# Patient Record
Sex: MALE | Race: BLACK OR AFRICAN AMERICAN | Employment: FULL TIME | URBAN - METROPOLITAN AREA
[De-identification: names, ages, dates, MRNs, and addresses within clinical notes are randomized per-mention and may not be internally consistent; named-entity substitution may affect disease eponyms.]

---

## 2018-03-19 ENCOUNTER — HOSPITAL ENCOUNTER (EMERGENCY)
Age: 63
Discharge: HOME | End: 2018-03-19
Attending: EMERGENCY MEDICINE | Admitting: EMERGENCY MEDICINE
Payer: COMMERCIAL

## 2018-03-19 VITALS
OXYGEN SATURATION: 95 % | DIASTOLIC BLOOD PRESSURE: 108 MMHG | RESPIRATION RATE: 20 BRPM | WEIGHT: 240 LBS | HEART RATE: 70 BPM | TEMPERATURE: 97.5 F | SYSTOLIC BLOOD PRESSURE: 179 MMHG | BODY MASS INDEX: 29.22 KG/M2 | HEIGHT: 76 IN

## 2018-03-19 DIAGNOSIS — I10 ESSENTIAL HYPERTENSION: Primary | ICD-10-CM

## 2018-03-19 LAB
ANION GAP SERPL CALC-SCNC: 9 MMOL/L (ref 5–15)
BASOPHILS # BLD: 0 K/UL (ref 0–0.1)
BASOPHILS NFR BLD: 0 % (ref 0–1)
BUN SERPL-MCNC: 25 MG/DL (ref 6–20)
BUN/CREAT SERPL: 14 (ref 12–20)
CALCIUM SERPL-MCNC: 8.7 MG/DL (ref 8.5–10.1)
CHLORIDE SERPL-SCNC: 103 MMOL/L (ref 97–108)
CO2 SERPL-SCNC: 29 MMOL/L (ref 21–32)
CREAT SERPL-MCNC: 1.77 MG/DL (ref 0.7–1.3)
DIFFERENTIAL METHOD BLD: ABNORMAL
EOSINOPHIL # BLD: 0.1 K/UL (ref 0–0.4)
EOSINOPHIL NFR BLD: 1 % (ref 0–7)
ERYTHROCYTE [DISTWIDTH] IN BLOOD BY AUTOMATED COUNT: 12.1 % (ref 11.5–14.5)
GLUCOSE SERPL-MCNC: 126 MG/DL (ref 65–100)
HCT VFR BLD AUTO: 41.4 % (ref 36.6–50.3)
HGB BLD-MCNC: 13.9 G/DL (ref 12.1–17)
IMM GRANULOCYTES # BLD: 0.1 K/UL (ref 0–0.04)
IMM GRANULOCYTES NFR BLD AUTO: 1 % (ref 0–0.5)
LYMPHOCYTES # BLD: 2.1 K/UL (ref 0.8–3.5)
LYMPHOCYTES NFR BLD: 30 % (ref 12–49)
MCH RBC QN AUTO: 31.2 PG (ref 26–34)
MCHC RBC AUTO-ENTMCNC: 33.6 G/DL (ref 30–36.5)
MCV RBC AUTO: 92.8 FL (ref 80–99)
MONOCYTES # BLD: 0.5 K/UL (ref 0–1)
MONOCYTES NFR BLD: 7 % (ref 5–13)
NEUTS SEG # BLD: 4.3 K/UL (ref 1.8–8)
NEUTS SEG NFR BLD: 61 % (ref 32–75)
NRBC # BLD: 0 K/UL (ref 0–0.01)
NRBC BLD-RTO: 0 PER 100 WBC
PLATELET # BLD AUTO: 181 K/UL (ref 150–400)
PMV BLD AUTO: 10.3 FL (ref 8.9–12.9)
POTASSIUM SERPL-SCNC: 3.5 MMOL/L (ref 3.5–5.1)
RBC # BLD AUTO: 4.46 M/UL (ref 4.1–5.7)
SODIUM SERPL-SCNC: 141 MMOL/L (ref 136–145)
TROPONIN I BLD-MCNC: <0.04 NG/ML (ref 0–0.08)
TROPONIN I SERPL-MCNC: <0.04 NG/ML
WBC # BLD AUTO: 7 K/UL (ref 4.1–11.1)

## 2018-03-19 PROCEDURE — 84484 ASSAY OF TROPONIN QUANT: CPT | Performed by: EMERGENCY MEDICINE

## 2018-03-19 PROCEDURE — 80048 BASIC METABOLIC PNL TOTAL CA: CPT | Performed by: EMERGENCY MEDICINE

## 2018-03-19 PROCEDURE — 74011250637 HC RX REV CODE- 250/637: Performed by: EMERGENCY MEDICINE

## 2018-03-19 PROCEDURE — 93005 ELECTROCARDIOGRAM TRACING: CPT

## 2018-03-19 PROCEDURE — 99285 EMERGENCY DEPT VISIT HI MDM: CPT

## 2018-03-19 PROCEDURE — 36415 COLL VENOUS BLD VENIPUNCTURE: CPT | Performed by: EMERGENCY MEDICINE

## 2018-03-19 PROCEDURE — 85025 COMPLETE CBC W/AUTO DIFF WBC: CPT | Performed by: EMERGENCY MEDICINE

## 2018-03-19 RX ORDER — LOSARTAN POTASSIUM 50 MG/1
50 TABLET ORAL DAILY
Qty: 30 TAB | Refills: 0 | Status: SHIPPED | OUTPATIENT
Start: 2018-03-19 | End: 2018-03-26 | Stop reason: ALTCHOICE

## 2018-03-19 RX ORDER — LOSARTAN POTASSIUM 50 MG/1
50 TABLET ORAL DAILY
Status: DISCONTINUED | OUTPATIENT
Start: 2018-03-20 | End: 2018-03-19 | Stop reason: SDUPTHER

## 2018-03-19 RX ORDER — LOSARTAN POTASSIUM 50 MG/1
50 TABLET ORAL
Status: COMPLETED | OUTPATIENT
Start: 2018-03-19 | End: 2018-03-19

## 2018-03-19 RX ORDER — SODIUM CHLORIDE 0.9 % (FLUSH) 0.9 %
5-10 SYRINGE (ML) INJECTION AS NEEDED
Status: DISCONTINUED | OUTPATIENT
Start: 2018-03-19 | End: 2018-03-20 | Stop reason: HOSPADM

## 2018-03-19 RX ORDER — SODIUM CHLORIDE 0.9 % (FLUSH) 0.9 %
5-10 SYRINGE (ML) INJECTION EVERY 8 HOURS
Status: DISCONTINUED | OUTPATIENT
Start: 2018-03-19 | End: 2018-03-20 | Stop reason: HOSPADM

## 2018-03-19 RX ADMIN — LOSARTAN POTASSIUM 50 MG: 50 TABLET ORAL at 19:01

## 2018-03-19 NOTE — ED PROVIDER NOTES
HPI Comments: 57 y.o. male with past medical history significant for C-spine arthritis, HTN, and CKD, who presents from work via EMS with chief complaint of dizziness. Per EMS, pt had a blood pressure of 240/98 en route and other vital signs were normal. Pt reports he started to feel dizzy today while seated at his desk at work. He describes the dizziness as a spinning sensation, but states, \"It was not that the room appeared to be spinning, but that when I stood, I felt my equilibrium was off. \" He reports associated nausea, mild diaphoresis, and anxiety. He states he is unsure if he had any visual disturbance, noting that he \"may have seen some spinning while feeling dizzy. \" Pt reports he felt nauseated and \"queasy\" and had a bowel movement; he states his nausea has \"mostly\" improved at this time. Pt states he had a similar event \"a couple months ago\" but thought it was due to dehydration. He admits he has been diagnosed with HTN and advised to take HTN medication, but has not been compliant. He states he has not seen his PCP \"in a while\" but agrees to do so soon. Pt denies any recent infection or abx use. He specifically denies any CP, abdominal pain, headache, speech disturbance, LOC, blood in stool, or hematuria. There are no other acute medical concerns at this time. Social hx: former tobacco use, denies EtOH use, illicit drug use  PCP: Delia Melo MD    Note written by Taz Francis, as dictated by Anh Medel MD 6:41 PM      The history is provided by the patient and the EMS personnel. No  was used. Past Medical History:   Diagnosis Date    CKD (chronic kidney disease) stage 3, GFR 30-59 ml/min     Dermatitis 7/3/2013    HTN (hypertension) 7/2/2013    Neck pain     c-spine arthritis       Past Surgical History:   Procedure Laterality Date    HX COLONOSCOPY  2000?          Family History:   Problem Relation Age of Onset    Cancer Mother 76     pancreatic    Cancer Father 76     lymphoma    Stroke Father 76       Social History     Social History    Marital status:      Spouse name: Leilani Patton Number of children: 2    Years of education: N/A     Occupational History   [employer]      Social History Main Topics    Smoking status: Former Smoker     Packs/day: 0.25     Types: Cigarettes     Quit date: 1/8/1985    Smokeless tobacco: Never Used    Alcohol use No    Drug use: No    Sexual activity: Yes     Other Topics Concern    Not on file     Social History Narrative    2 daughters, 12, 15         ALLERGIES: Phenylephrine    Review of Systems   Constitutional: Positive for diaphoresis. Negative for appetite change, fever and unexpected weight change. HENT: Negative. Negative for ear pain, hearing loss, nosebleeds, rhinorrhea, sore throat and trouble swallowing. Respiratory: Negative. Negative for cough, chest tightness and shortness of breath. Cardiovascular: Negative. Negative for chest pain and palpitations. Gastrointestinal: Positive for nausea. Negative for abdominal distention, abdominal pain, blood in stool and vomiting. Endocrine: Negative. Genitourinary: Negative for dysuria and hematuria. Musculoskeletal: Negative. Negative for back pain and myalgias. Skin: Negative. Negative for rash. Allergic/Immunologic: Negative. Neurological: Positive for dizziness. Negative for syncope, speech difficulty, weakness, numbness and headaches. Hematological: Negative. Psychiatric/Behavioral: The patient is nervous/anxious. All other systems reviewed and are negative. Vitals:    03/19/18 1845 03/19/18 1900 03/19/18 1901   BP: (!) 191/104 (!) 180/107 (!) 180/107   Pulse: 69 63 63   Resp: 16 19    Temp: 97.5 °F (36.4 °C)     SpO2: 96% 96%    Weight: 108.9 kg (240 lb)     Height: 6' 4\" (1.93 m)              Physical Exam   Constitutional: He is oriented to person, place, and time.  He appears well-developed and well-nourished. No distress. HENT:   Head: Normocephalic and atraumatic. Right Ear: External ear normal.   Left Ear: External ear normal.   Nose: Nose normal.   Mouth/Throat: Oropharynx is clear and moist.   Eyes: Conjunctivae and EOM are normal. Pupils are equal, round, and reactive to light. Neck: Normal range of motion. Neck supple. No JVD present. No thyromegaly present. Cardiovascular: Normal rate, regular rhythm, normal heart sounds and intact distal pulses. No murmur heard. Pulmonary/Chest: Effort normal and breath sounds normal. No respiratory distress. He has no wheezes. He has no rales. Abdominal: Soft. Bowel sounds are normal. He exhibits no distension. There is no tenderness. Musculoskeletal: Normal range of motion. He exhibits no edema. Neurological: He is alert and oriented to person, place, and time. No cranial nerve deficit. Skin: Skin is warm and dry. No rash noted. Psychiatric: He has a normal mood and affect. His behavior is normal. Thought content normal.   Note written by Taz Francis, as dictated by Anh Medel MD 6:41 PM      MDM  Number of Diagnoses or Management Options  Diagnosis management comments:     Assessment/Plan: Check labs, monitor BP, and reassess. ED Course       Procedures    ED EKG interpretation: 1853  Rate 61, NSR, left axis deviation, LVH, nonspecific ST/T changes, no ectopy. Note written by Taz Francis, as dictated by Anh Medel MD 6:56 PM    7:39 PM  Pt's chemistry is significant for a creatinine of 1.7, his previous creatinine from 2015 was 1.59; his CBC is normal, his troponin is normal, his last blood pressure was 180/107, we will continue to monitor his blood pressure at this time, will check second troponin at 2 hour harley and reassess.     8:25 PM  Feels much better since his blood pressure has gone down, 2nd troponin is negative, will d/c home with Rx for Losartan and close f/u with PCP.

## 2018-03-20 LAB
ATRIAL RATE: 61 BPM
CALCULATED P AXIS, ECG09: 57 DEGREES
CALCULATED R AXIS, ECG10: -34 DEGREES
CALCULATED T AXIS, ECG11: 5 DEGREES
DIAGNOSIS, 93000: NORMAL
P-R INTERVAL, ECG05: 192 MS
Q-T INTERVAL, ECG07: 454 MS
QRS DURATION, ECG06: 98 MS
QTC CALCULATION (BEZET), ECG08: 457 MS
VENTRICULAR RATE, ECG03: 61 BPM

## 2018-03-20 NOTE — DISCHARGE INSTRUCTIONS
High Blood Pressure: Care Instructions  Your Care Instructions    If your blood pressure is usually above 140/90, you have high blood pressure, or hypertension. That means the top number is 140 or higher or the bottom number is 90 or higher, or both. Despite what a lot of people think, high blood pressure usually doesn't cause headaches or make you feel dizzy or lightheaded. It usually has no symptoms. But it does increase your risk for heart attack, stroke, and kidney or eye damage. The higher your blood pressure, the more your risk increases. Your doctor will give you a goal for your blood pressure. Your goal will be based on your health and your age. An example of a goal is to keep your blood pressure below 140/90. Lifestyle changes, such as eating healthy and being active, are always important to help lower blood pressure. You might also take medicine to reach your blood pressure goal.  Follow-up care is a key part of your treatment and safety. Be sure to make and go to all appointments, and call your doctor if you are having problems. It's also a good idea to know your test results and keep a list of the medicines you take. How can you care for yourself at home? Medical treatment  · If you stop taking your medicine, your blood pressure will go back up. You may take one or more types of medicine to lower your blood pressure. Be safe with medicines. Take your medicine exactly as prescribed. Call your doctor if you think you are having a problem with your medicine. · Talk to your doctor before you start taking aspirin every day. Aspirin can help certain people lower their risk of a heart attack or stroke. But taking aspirin isn't right for everyone, because it can cause serious bleeding. · See your doctor regularly. You may need to see the doctor more often at first or until your blood pressure comes down.   · If you are taking blood pressure medicine, talk to your doctor before you take decongestants or anti-inflammatory medicine, such as ibuprofen. Some of these medicines can raise blood pressure. · Learn how to check your blood pressure at home. Lifestyle changes  · Stay at a healthy weight. This is especially important if you put on weight around the waist. Losing even 10 pounds can help you lower your blood pressure. · If your doctor recommends it, get more exercise. Walking is a good choice. Bit by bit, increase the amount you walk every day. Try for at least 30 minutes on most days of the week. You also may want to swim, bike, or do other activities. · Avoid or limit alcohol. Talk to your doctor about whether you can drink any alcohol. · Try to limit how much sodium you eat to less than 2,300 milligrams (mg) a day. Your doctor may ask you to try to eat less than 1,500 mg a day. · Eat plenty of fruits (such as bananas and oranges), vegetables, legumes, whole grains, and low-fat dairy products. · Lower the amount of saturated fat in your diet. Saturated fat is found in animal products such as milk, cheese, and meat. Limiting these foods may help you lose weight and also lower your risk for heart disease. · Do not smoke. Smoking increases your risk for heart attack and stroke. If you need help quitting, talk to your doctor about stop-smoking programs and medicines. These can increase your chances of quitting for good. When should you call for help? Call 911 anytime you think you may need emergency care. This may mean having symptoms that suggest that your blood pressure is causing a serious heart or blood vessel problem. Your blood pressure may be over 180/110. ? For example, call 911 if:  ? · You have symptoms of a heart attack. These may include:  ¨ Chest pain or pressure, or a strange feeling in the chest.  ¨ Sweating. ¨ Shortness of breath. ¨ Nausea or vomiting.   ¨ Pain, pressure, or a strange feeling in the back, neck, jaw, or upper belly or in one or both shoulders or arms.  ¨ Lightheadedness or sudden weakness. ¨ A fast or irregular heartbeat. ? · You have symptoms of a stroke. These may include:  ¨ Sudden numbness, tingling, weakness, or loss of movement in your face, arm, or leg, especially on only one side of your body. ¨ Sudden vision changes. ¨ Sudden trouble speaking. ¨ Sudden confusion or trouble understanding simple statements. ¨ Sudden problems with walking or balance. ¨ A sudden, severe headache that is different from past headaches. ? · You have severe back or belly pain. ?Do not wait until your blood pressure comes down on its own. Get help right away. ?Call your doctor now or seek immediate care if:  ? · Your blood pressure is much higher than normal (such as 180/110 or higher), but you don't have symptoms. ? · You think high blood pressure is causing symptoms, such as:  ¨ Severe headache. ¨ Blurry vision. ? Watch closely for changes in your health, and be sure to contact your doctor if:  ? · Your blood pressure measures 140/90 or higher at least 2 times. That means the top number is 140 or higher or the bottom number is 90 or higher, or both. ? · You think you may be having side effects from your blood pressure medicine. ? · Your blood pressure is usually normal, but it goes above normal at least 2 times. Where can you learn more? Go to http://luciano-dior.info/. Enter Q994 in the search box to learn more about \"High Blood Pressure: Care Instructions. \"  Current as of: September 21, 2016  Content Version: 11.4  © 2570-2205 [employer]. Care instructions adapted under license by [employer] (which disclaims liability or warranty for this information). If you have questions about a medical condition or this instruction, always ask your healthcare professional. [place] [address] any warranty or liability for your use of this information.          We hope that we have addressed all of your medical concerns. The examination and treatment you received in the Emergency Department were for an emergent problem and were not intended as complete care. It is important that you follow up with your healthcare provider(s) for ongoing care. If your symptoms worsen or do not improve as expected, and you are unable to reach your usual health care provider(s), you should return to the Emergency Department. Today's healthcare is undergoing tremendous change, and patient satisfaction surveys are one of the many tools to assess the quality of medical care. You may receive a survey from the [employer] regarding your experience in the Emergency Department. I hope that your experience has been completely positive, particularly the medical care that I provided. As such, please participate in the survey; anything less than excellent does not meet my expectations or intentions. Formerly [facility] and 8 [facility] participate in nationally recognized quality of care measures. If your blood pressure is greater than 120/80, as reported below, we urge that you seek medical care to address the potential of high blood pressure, commonly known as hypertension. Hypertension can be hereditary or can be caused by certain medical conditions, pain, stress, or \"white coat syndrome. \"       Please make an appointment with your health care provider(s) for follow up of your Emergency Department visit. VITALS:   Patient Vitals for the past 8 hrs:   Temp Pulse Resp BP SpO2   03/19/18 2030 - 63 19 (!) 188/105 96 %   03/19/18 2000 - 62 18 (!) 193/113 96 %   03/19/18 1930 - 60 17 (!) 176/110 95 %   03/19/18 1915 - 62 12 (!) 191/114 95 %   03/19/18 1901 - 63 - (!) 180/107 -   03/19/18 1900 - 63 19 (!) 180/107 96 %   03/19/18 1845 97.5 °F (36.4 °C) 69 16 (!) 191/104 96 %          Thank you for allowing us to provide you with medical care today.   We realize that you have many choices for your emergency care needs. Please choose us in the future for any continued health care needs. Regards,           Pedro Preciado [address].   Office: [phone]            Recent Results (from the past 24 hour(s))   METABOLIC PANEL, BASIC    Collection Time: 03/19/18  6:53 PM   Result Value Ref Range    Sodium 141 136 - 145 mmol/L    Potassium 3.5 3.5 - 5.1 mmol/L    Chloride 103 97 - 108 mmol/L    CO2 29 21 - 32 mmol/L    Anion gap 9 5 - 15 mmol/L    Glucose 126 (H) 65 - 100 mg/dL    BUN 25 (H) 6 - 20 MG/DL    Creatinine 1.77 (H) 0.70 - 1.30 MG/DL    BUN/Creatinine ratio 14 12 - 20      GFR est AA 47 (L) >60 ml/min/1.73m2    GFR est non-AA 39 (L) >60 ml/min/1.73m2    Calcium 8.7 8.5 - 10.1 MG/DL   CBC WITH AUTOMATED DIFF    Collection Time: 03/19/18  6:53 PM   Result Value Ref Range    WBC 7.0 4.1 - 11.1 K/uL    RBC 4.46 4.10 - 5.70 M/uL    HGB 13.9 12.1 - 17.0 g/dL    HCT 41.4 36.6 - 50.3 %    MCV 92.8 80.0 - 99.0 FL    MCH 31.2 26.0 - 34.0 PG    MCHC 33.6 30.0 - 36.5 g/dL    RDW 12.1 11.5 - 14.5 %    PLATELET 906 186 - 765 K/uL    MPV 10.3 8.9 - 12.9 FL    NRBC 0.0 0  WBC    ABSOLUTE NRBC 0.00 0.00 - 0.01 K/uL    NEUTROPHILS 61 32 - 75 %    LYMPHOCYTES 30 12 - 49 %    MONOCYTES 7 5 - 13 %    EOSINOPHILS 1 0 - 7 %    BASOPHILS 0 0 - 1 %    IMMATURE GRANULOCYTES 1 (H) 0.0 - 0.5 %    ABS. NEUTROPHILS 4.3 1.8 - 8.0 K/UL    ABS. LYMPHOCYTES 2.1 0.8 - 3.5 K/UL    ABS. MONOCYTES 0.5 0.0 - 1.0 K/UL    ABS. EOSINOPHILS 0.1 0.0 - 0.4 K/UL    ABS. BASOPHILS 0.0 0.0 - 0.1 K/UL    ABS. IMM.  GRANS. 0.1 (H) 0.00 - 0.04 K/UL    DF AUTOMATED     TROPONIN I    Collection Time: 03/19/18  6:53 PM   Result Value Ref Range    Troponin-I, Qt. <0.04 <0.05 ng/mL   EKG, 12 LEAD, INITIAL    Collection Time: 03/19/18  6:53 PM   Result Value Ref Range    Ventricular Rate 61 BPM    Atrial Rate 61 BPM    P-R Interval 192 ms    QRS Duration 98 ms    Q-T Interval 454 ms    QTC Calculation (Bezet) 457 ms    Calculated P Axis 57 degrees    Calculated R Axis -34 degrees    Calculated T Axis 5 degrees    Diagnosis       Normal sinus rhythm  Left axis deviation  Voltage criteria for left ventricular hypertrophy  Abnormal ECG  No previous ECGs available     POC TROPONIN-I    Collection Time: 03/19/18  8:41 PM   Result Value Ref Range    Troponin-I (POC) <0.04 0.00 - 0.08 ng/mL       No results found.

## 2018-03-26 ENCOUNTER — OFFICE VISIT (OUTPATIENT)
Dept: INTERNAL MEDICINE CLINIC | Age: 63
End: 2018-03-26

## 2018-03-26 VITALS
SYSTOLIC BLOOD PRESSURE: 171 MMHG | OXYGEN SATURATION: 96 % | TEMPERATURE: 97.8 F | WEIGHT: 242 LBS | RESPIRATION RATE: 12 BRPM | BODY MASS INDEX: 29.47 KG/M2 | HEART RATE: 76 BPM | HEIGHT: 76 IN | DIASTOLIC BLOOD PRESSURE: 108 MMHG

## 2018-03-26 DIAGNOSIS — N18.30 CKD (CHRONIC KIDNEY DISEASE) STAGE 3, GFR 30-59 ML/MIN (HCC): ICD-10-CM

## 2018-03-26 DIAGNOSIS — I10 ESSENTIAL HYPERTENSION: Primary | ICD-10-CM

## 2018-03-26 RX ORDER — LOSARTAN POTASSIUM 50 MG/1
50 TABLET ORAL DAILY
Qty: 30 TAB | Refills: 1 | Status: SHIPPED | OUTPATIENT
Start: 2018-03-26 | End: 2018-04-23 | Stop reason: ALTCHOICE

## 2018-03-26 RX ORDER — AMLODIPINE BESYLATE 5 MG/1
5 TABLET ORAL DAILY
Qty: 30 TAB | Refills: 1 | Status: SHIPPED | OUTPATIENT
Start: 2018-03-26 | End: 2018-04-23 | Stop reason: CLARIF

## 2018-03-26 NOTE — MR AVS SNAPSHOT
[address] [facility], [place] 
 
 
 [address] [place] [address] [address] [facility] 
[phone] Patient: Marsha Petty 
MRN: [id number] Visit Information Date & Time Provider Department Dept. Phone Encounter #  
 3/26/2018  8:00 AM Farshad Grimes MD [facility] [address]  [id number] Upcoming Health Maintenance Date Due Hepatitis C Screening 1955 COLONOSCOPY 1/1/2010 DTaP/Tdap/Td series (2 - Tdap) 1/1/2017 Influenza Age 5 to Adult 8/1/2017 Allergies as of 3/26/2018  Review Complete On: 3/26/2018 By: Farshad Grimes MD  
  
 Severity Noted Reaction Type Reactions Phenylephrine  02/16/2012   Intolerance Nausea and Vomiting, Anxiety Current Immunizations  Reviewed on 3/30/2015 Name Date DTAP Vaccine 1/1/2007 Hep A and Hep B Vaccine 7/25/2013, 7/9/2013, 7/2/2013 Not reviewed this visit You Were Diagnosed With   
  
 Codes Comments Essential hypertension    -  Primary ICD-10-CM: I10 
ICD-9-CM: 401.9 CKD (chronic kidney disease) stage 3, GFR 30-59 ml/min     ICD-10-CM: N18.3 ICD-9-CM: 350. 3 Vitals BP Pulse Temp Resp Height(growth percentile) Weight(growth percentile) (!) 171/108 (BP 1 Location: Left arm, BP Patient Position: Sitting) 76 97.8 °F (36.6 °C) 12 6' 4\" (1.93 m) 242 lb (109.8 kg) SpO2 BMI Smoking Status 96% 29.46 kg/m2 Former Smoker Vitals History BMI and BSA Data Body Mass Index Body Surface Area  
 29.46 kg/m 2 2.43 m 2 Preferred Pharmacy Pharmacy Name Phone [id number] [facility] [phone] Your Updated Medication List  
  
   
This list is accurate as of 3/26/18  8:32 AM.  Always use your most recent med list. amLODIPine 5 mg tablet Commonly known as:  Cookie Boozer Take 1 Tab by mouth daily. Indications: hypertension hydrocortisone valerate 0.2 % topical cream  
Commonly known as:  [employer] Apply  to affected area two (2) times a day. use thin layer  
  
 losartan 50 mg tablet Commonly known as:  COZAAR Take 1 Tab by mouth daily. multivitamin tablet Commonly known as:  ONE A DAY Take 1 Tab by mouth daily. Prescriptions Sent to Pharmacy Refills  
 amLODIPine (NORVASC) 5 mg tablet 1 Sig: Take 1 Tab by mouth daily. Indications: hypertension Class: Normal  
 Pharmacy: [address] [place] [address] Ph #: [phone] Route: Oral  
 losartan (COZAAR) 50 mg tablet 1 Sig: Take 1 Tab by mouth daily. Class: Normal  
 Pharmacy: [address] [place] [address] Ph #: [phone] Route: Oral  
  
Introducing [facility] SERVICES! [facility] introduces [employer] patient portal. Now you can access parts of your medical record, email your doctor's office, and request medication refills online. 1. In your internet browser, go to [email or web address]. [employer] 2. Click on the First Time User? Click Here link in the Sign In box. You will see the New Member Sign Up page. 3. Enter your Leadjini Access Code exactly as it appears below. You will not need to use this code after youve completed the sign-up process. If you do not sign up before the expiration date, you must request a new code. · Leadjini Access Code: [email or web address] Expires: 6/17/2018  8:53 PM 
 
4. Enter the last four digits of your Social Security Number (xxxx) and Date of Birth (mm/dd/yyyy) as indicated and click Submit. You will be taken to the next sign-up page. 5. Create a EventKloudt ID. This will be your Leadjini login ID and cannot be changed, so think of one that is secure and easy to remember. 6. Create a [employer] password. You can change your password at any time. 7. Enter your Password Reset Question and Answer. This can be used at a later time if you forget your password. 8. Enter your e-mail address. You will receive e-mail notification when new information is available in [address] 9. Click Sign Up. You can now view and download portions of your medical record. 10. Click the Download Summary menu link to download a portable copy of your medical information. If you have questions, please visit the Frequently Asked Questions section of the [employer] website. Remember, one[employer]rum is NOT to be used for urgent needs. For medical emergencies, dial 911. Now available from your iPhone and Android! Please provide this summary of care documentation to your next provider. Your primary care clinician is listed as Kimo Beck. If you have any questions after today's visit, please call [phone].

## 2018-03-26 NOTE — PROGRESS NOTES
HISTORY OF PRESENT ILLNESS    Chief Complaint   Patient presents with    ED Follow-up       Presents for follow-up. Last seen 2015  Seen in ER 3/19/18 for vertigo s/s, severe HTN. EKG normal.    Labs showed stable CKD/renal function and cbc. His home BP is 150-180/100s now. Was re-started on losartan which he stopped taking over 2 years ago since he wanted to control his BP with exercise. Lab Results   Component Value Date/Time    Creatinine 1.77 (H) 03/19/2018 06:53 PM         Review of Systems   All other systems reviewed and are negative, except as noted in HPI    Past Medical and Surgical History   has a past medical history of CKD (chronic kidney disease) stage 3, GFR 30-59 ml/min; Dermatitis (7/3/2013); HTN (hypertension) (7/2/2013); and Neck pain. has a past surgical history that includes hx colonoscopy (2000?). reports that he quit smoking about 33 years ago. His smoking use included Cigarettes. He smoked 0.25 packs per day. He has never used smokeless tobacco. He reports that he does not drink alcohol or use illicit drugs. family history includes Cancer (age of onset: 76) in his father and mother; Stroke (age of onset: 76) in his father. Physical Exam   Nursing note and vitals reviewed. Blood pressure (!) 171/108, pulse 76, temperature 97.8 °F (36.6 °C), resp. rate 12, height 6' 4\" (1.93 m), weight 242 lb (109.8 kg), SpO2 96 %. Constitutional:  No distress. Eyes: Conjunctivae are normal.   Ears:  Hearing grossly intact  Cardiovascular: Normal rate. regular rhythm, no murmurs or gallops  No edema  Pulmonary/Chest: Effort normal.   CTAB  Musculoskeletal: moves all 4 extremities   Neurological: Alert and oriented to person, place, and time. Skin: No rash noted. Psychiatric: Normal mood and affect. Behavior is normal.     ASSESSMENT and PLAN  Diagnoses and all orders for this visit:    1. Essential hypertension-   remains uncontrolled, but just resumed meds.      Will add norvasc and cont losartan. Titrate in 1-2 months. Potential medication side effects were discussed with the patient; let me know if any occur. Advised to do only light exercise until BP is improving. Stressed compliance. Low sodium diet. Repeat renal function 1 month. -     amLODIPine (NORVASC) 5 mg tablet; Take 1 Tab by mouth daily. Indications: hypertension  -     losartan (COZAAR) 50 mg tablet; Take 1 Tab by mouth daily. 2. CKD (chronic kidney disease) stage 3, GFR 30-59 ml/min  -     losartan (COZAAR) 50 mg tablet; Take 1 Tab by mouth daily. lab results and schedule of future lab studies reviewed with patient  reviewed medications and side effects in detail  Return to clinic for further evaluation if new symptoms develop      Current Outpatient Prescriptions   Medication Sig    amLODIPine (NORVASC) 5 mg tablet Take 1 Tab by mouth daily. Indications: hypertension    losartan (COZAAR) 50 mg tablet Take 1 Tab by mouth daily.  hydrocortisone valerate (WESTCORT) 0.2 % topical cream Apply  to affected area two (2) times a day. use thin layer    multivitamin (ONE A DAY) tablet Take 1 Tab by mouth daily. No current facility-administered medications for this visit.

## 2018-03-27 ENCOUNTER — TELEPHONE (OUTPATIENT)
Dept: INTERNAL MEDICINE CLINIC | Age: 63
End: 2018-03-27

## 2018-03-27 NOTE — TELEPHONE ENCOUNTER
Pt has some questions about the new prescriptions. He wants to make sure that he needs to stop previous prescriptions and start the new ones.  [phone]

## 2018-04-23 ENCOUNTER — OFFICE VISIT (OUTPATIENT)
Dept: INTERNAL MEDICINE CLINIC | Age: 63
End: 2018-04-23

## 2018-04-23 VITALS
OXYGEN SATURATION: 96 % | HEART RATE: 75 BPM | RESPIRATION RATE: 16 BRPM | DIASTOLIC BLOOD PRESSURE: 108 MMHG | HEIGHT: 76 IN | WEIGHT: 244.2 LBS | BODY MASS INDEX: 29.74 KG/M2 | SYSTOLIC BLOOD PRESSURE: 168 MMHG | TEMPERATURE: 97.8 F

## 2018-04-23 DIAGNOSIS — I10 ESSENTIAL HYPERTENSION: Primary | ICD-10-CM

## 2018-04-23 DIAGNOSIS — N18.30 CKD (CHRONIC KIDNEY DISEASE) STAGE 3, GFR 30-59 ML/MIN (HCC): ICD-10-CM

## 2018-04-23 RX ORDER — OLMESARTAN MEDOXOMIL 40 MG/1
40 TABLET ORAL DAILY
Qty: 30 TAB | Refills: 1 | Status: SHIPPED | OUTPATIENT
Start: 2018-04-23 | End: 2020-11-03

## 2018-04-23 NOTE — MR AVS SNAPSHOT
[facility] 
 
 
 [address] [place] Leitz 1007 [facility] 
[phone] Patient: Michael Renner 
MRN: [id number] Visit Information Date & Time Provider Department Dept. Phone Encounter #  
 4/23/2018  8:00 AM Becky Bobo MD [facility] [address] [id number] Upcoming Health Maintenance Date Due Hepatitis C Screening 1955 COLONOSCOPY 1/1/2010 DTaP/Tdap/Td series (2 - Tdap) 1/1/2017 Influenza Age 5 to Adult 8/1/2017 Allergies as of 4/23/2018  Review Complete On: 4/23/2018 By: Becky Bobo MD  
  
 Severity Noted Reaction Type Reactions Phenylephrine  02/16/2012   Intolerance Nausea and Vomiting, Anxiety Current Immunizations  Reviewed on 3/30/2015 Name Date DTAP Vaccine 1/1/2007 Hep A and Hep B Vaccine 7/25/2013, 7/9/2013, 7/2/2013 Not reviewed this visit You Were Diagnosed With   
  
 Codes Comments Essential hypertension    -  Primary ICD-10-CM: I10 
ICD-9-CM: 401.9 CKD (chronic kidney disease) stage 3, GFR 30-59 ml/min     ICD-10-CM: N18.3 ICD-9-CM: 571. 3 Vitals BP Pulse Temp Resp Height(growth percentile) Weight(growth percentile) (!) 168/108 (BP 1 Location: Left arm, BP Patient Position: Sitting) 75 97.8 °F (36.6 °C) (Oral) 16 6' 4\" (1.93 m) 244 lb 3.2 oz (110.8 kg) SpO2 BMI Smoking Status 96% 29.72 kg/m2 Former Smoker Vitals History BMI and BSA Data Body Mass Index Body Surface Area  
 29.72 kg/m 2 2.44 m 2 Preferred Pharmacy Pharmacy Name Phone [id number] [place], [address] [facility] [phone] Your Updated Medication List  
  
   
This list is accurate as of 4/23/18  8:29 AM.  Always use your most recent med list.  
  
  
  
  
 hydrocortisone valerate 0.2 % topical cream  
Commonly known as:  [employer]  
 Apply  to affected area two (2) times a day. use thin layer  
  
 multivitamin tablet Commonly known as:  ONE A DAY Take 1 Tab by mouth daily. olmesartan 40 mg tablet Commonly known as:  [employer] Take 1 Tab by mouth daily. Replaces losartan  
  
  
  
  
Prescriptions Sent to Pharmacy Refills  
 olmesartan (BENICAR) 40 mg tablet 1 Sig: Take 1 Tab by mouth daily. Replaces losartan Class: Normal  
 Pharmacy: [address] [place] [address] #: [phone] Route: Oral  
  
Introducing [facility]! [facility] introduces [employer]OP patient portal. Now you can access parts of your medical record, email your doctor's office, and request medication refills online. 1. In your internet browser, go to [email or web address] 2. Click on the First Time User? Click Here link in the Sign In box. You will see the New Member Sign Up page. 3. Enter your WALTOP Access Code exactly as it appears below. You will not need to use this code after youve completed the sign-up process. If you do not sign up before the expiration date, you must request a new code. · WALTOP Access Code: [email or web address] Expires: 6/17/2018  8:53 PM 
 
4. Enter the last four digits of your Social Security Number (xxxx) and Date of Birth (mm/dd/yyyy) as indicated and click Submit. You will be taken to the next sign-up page. 5. Create a WALTOP ID. This will be your WALTOP login ID and cannot be changed, so think of one that is secure and easy to remember. 6. Create a WALTOP password. You can change your password at any time. 7. Enter your Password Reset Question and Answer. This can be used at a later time if you forget your password. 8. Enter your e-mail address. You will receive e-mail notification when new information is available in [address] 9. Click Sign Up. You can now view and download portions of your medical record. 10. Click the Download Summary menu link to download a portable copy of your medical information. If you have questions, please visit the Frequently Asked Questions section of the [employer] website. Remember, WebT[employer]er is NOT to be used for urgent needs. For medical emergencies, dial 911. Now available from your iPhone and Android! Please provide this summary of care documentation to your next provider. Your primary care clinician is listed as Clabe Plate. If you have any questions after today's visit, please call [phone].

## 2018-04-23 NOTE — PROGRESS NOTES
Symone Terry is a 57 y.o. male    Chief Complaint   Patient presents with   [facility] Follow Up     4 week        1. Have you been to the ER, urgent care clinic since your last visit? Hospitalized since your last visit? No    2. Have you seen or consulted any other health care providers outside of the [address] [place] [address] since your last visit? Include any pap smears or colon screening.   No    Visit Vitals    BP (!) 167/108 (BP 1 Location: Left arm, BP Patient Position: Sitting)    Pulse 75    Temp 97.8 °F (36.6 °C) (Oral)    Resp 16    Ht 6' 4\" (1.93 m)    Wt 244 lb 3.2 oz (110.8 kg)    SpO2 96%    BMI 29.72 kg/m2

## 2018-04-23 NOTE — PROGRESS NOTES
HISTORY OF PRESENT ILLNESS    Chief Complaint   Patient presents with    Hypertension     4 week        Presents for follow-up    Hypertension  Taking losartan  He stopped amlodipine after 2 weeks since he felt fatigue. Hypertension ROS:no TIA's, no chest pain on exertion, no dyspnea on exertion, no swelling of ankles     reports that he quit smoking about 33 years ago. His smoking use included Cigarettes. He smoked 0.25 packs per day. He has never used smokeless tobacco.    reports that he does not drink alcohol. BP Readings from Last 2 Encounters:   04/23/18 (!) 168/108   03/26/18 (!) 171/108         Review of Systems   All other systems reviewed and are negative, except as noted in HPI    Past Medical and Surgical History   has a past medical history of CKD (chronic kidney disease) stage 3, GFR 30-59 ml/min; Dermatitis (7/3/2013); HTN (hypertension) (7/2/2013); and Neck pain. has a past surgical history that includes hx colonoscopy (2000?). reports that he quit smoking about 33 years ago. His smoking use included Cigarettes. He smoked 0.25 packs per day. He has never used smokeless tobacco. He reports that he does not drink alcohol or use illicit drugs. family history includes Cancer (age of onset: 76) in his father and mother; Stroke (age of onset: 76) in his father. Physical Exam   Nursing note and vitals reviewed. Blood pressure (!) 168/108, pulse 75, temperature 97.8 °F (36.6 °C), temperature source Oral, resp. rate 16, height 6' 4\" (1.93 m), weight 244 lb 3.2 oz (110.8 kg), SpO2 96 %. Constitutional:  No distress. Eyes: Conjunctivae are normal.   Ears:  Hearing grossly intact  Cardiovascular: Normal rate. regular rhythm, no murmurs or gallops  No edema  Pulmonary/Chest: Effort normal.   CTAB  Musculoskeletal: moves all 4 extremities   Neurological: Alert and oriented to person, place, and time. Skin: No rash noted. Psychiatric: Normal mood and affect.  Behavior is normal. ASSESSMENT and PLAN  Diagnoses and all orders for this visit:    1. Essential hypertension  -     olmesartan (BENICAR) 40 mg tablet; Take 1 Tab by mouth daily. Replaces losartan    2. CKD (chronic kidney disease) stage 3, GFR 30-59 ml/min  -     olmesartan (BENICAR) 40 mg tablet; Take 1 Tab by mouth daily. Replaces losartan      F/u 3 months    lab results and schedule of future lab studies reviewed with patient  reviewed medications and side effects in detail  Return to clinic for further evaluation if new symptoms develop      Current Outpatient Prescriptions   Medication Sig    olmesartan (BENICAR) 40 mg tablet Take 1 Tab by mouth daily. Replaces losartan    hydrocortisone valerate (WESTCORT) 0.2 % topical cream Apply  to affected area two (2) times a day. use thin layer    multivitamin (ONE A DAY) tablet Take 1 Tab by mouth daily. No current facility-administered medications for this visit.

## 2020-11-02 ENCOUNTER — TELEPHONE (OUTPATIENT)
Dept: INTERNAL MEDICINE CLINIC | Age: 65
End: 2020-11-02

## 2020-11-02 NOTE — TELEPHONE ENCOUNTER
I called pt back, last seen by pcp in 2018. He states he BP has been high 154/120. He denies any unusal sx. States the swelling of his ankles is most likely from sitting more working from home now. He was already schedule for an in office appt with MD tomorrow. Pt was thankful for the call.

## 2020-11-02 NOTE — TELEPHONE ENCOUNTER
----- Message from Jasmin Zhu sent at 11/2/2020  9:53 AM EST -----  Regarding: Dr. Raul Gibbs telephone  Level 1/Escalated Issue      Caller's first and last name and relationship (if not the patient): Idania Jhaveri (spouse)      Best contact number(s):  [phone] if unable to reach her by that number please call this number [phone]       What are the symptoms: elevated bp, pt was also experiencing swollen ankles a few weeks ago. (caller does not have a reading). Transfer successful - yes/no (include outcome): no       Transfer declined - yes/no (include reason): no       Was caller advised to seek appropriate level of care - yes/no: yes       Details to clarify the request: Destiney Ronquillo is requesting for pt to see Dr. Rebekah Marshall as soon as possible either VV or in-office.          Jasmin Zhu

## 2020-11-03 ENCOUNTER — OFFICE VISIT (OUTPATIENT)
Dept: INTERNAL MEDICINE CLINIC | Age: 65
End: 2020-11-03
Payer: COMMERCIAL

## 2020-11-03 VITALS
WEIGHT: 247.4 LBS | BODY MASS INDEX: 30.13 KG/M2 | SYSTOLIC BLOOD PRESSURE: 160 MMHG | HEART RATE: 62 BPM | OXYGEN SATURATION: 94 % | HEIGHT: 76 IN | TEMPERATURE: 98.4 F | DIASTOLIC BLOOD PRESSURE: 100 MMHG | RESPIRATION RATE: 14 BRPM

## 2020-11-03 DIAGNOSIS — N18.30 STAGE 3 CHRONIC KIDNEY DISEASE, UNSPECIFIED WHETHER STAGE 3A OR 3B CKD (HCC): ICD-10-CM

## 2020-11-03 DIAGNOSIS — I10 ESSENTIAL HYPERTENSION: Primary | ICD-10-CM

## 2020-11-03 LAB
ALBUMIN SERPL-MCNC: 3.9 G/DL (ref 3.5–5)
ALBUMIN/GLOB SERPL: 1.1 {RATIO} (ref 1.1–2.2)
ALP SERPL-CCNC: 74 U/L (ref 45–117)
ALT SERPL-CCNC: 28 U/L (ref 12–78)
ANION GAP SERPL CALC-SCNC: 7 MMOL/L (ref 5–15)
AST SERPL-CCNC: 30 U/L (ref 15–37)
BILIRUB SERPL-MCNC: 1.1 MG/DL (ref 0.2–1)
BUN SERPL-MCNC: 20 MG/DL (ref 6–20)
BUN/CREAT SERPL: 12 (ref 12–20)
CALCIUM SERPL-MCNC: 9.2 MG/DL (ref 8.5–10.1)
CHLORIDE SERPL-SCNC: 105 MMOL/L (ref 97–108)
CO2 SERPL-SCNC: 27 MMOL/L (ref 21–32)
CREAT SERPL-MCNC: 1.69 MG/DL (ref 0.7–1.3)
GLOBULIN SER CALC-MCNC: 3.6 G/DL (ref 2–4)
GLUCOSE SERPL-MCNC: 85 MG/DL (ref 65–100)
POTASSIUM SERPL-SCNC: 4.2 MMOL/L (ref 3.5–5.1)
PROT SERPL-MCNC: 7.5 G/DL (ref 6.4–8.2)
SODIUM SERPL-SCNC: 139 MMOL/L (ref 136–145)

## 2020-11-03 PROCEDURE — 99214 OFFICE O/P EST MOD 30 MIN: CPT | Performed by: INTERNAL MEDICINE

## 2020-11-03 RX ORDER — AMLODIPINE BESYLATE 5 MG/1
5 TABLET ORAL DAILY
Qty: 30 TAB | Refills: 3 | Status: SHIPPED | OUTPATIENT
Start: 2020-11-03 | End: 2020-11-29

## 2020-11-03 NOTE — PROGRESS NOTES
Pt. Was educated on the shingles vaccine and refused    Pt stated that he checked his bp at home sometimes

## 2020-11-29 DIAGNOSIS — N18.30 STAGE 3 CHRONIC KIDNEY DISEASE, UNSPECIFIED WHETHER STAGE 3A OR 3B CKD (HCC): ICD-10-CM

## 2020-11-29 DIAGNOSIS — I10 ESSENTIAL HYPERTENSION: ICD-10-CM

## 2020-11-29 RX ORDER — AMLODIPINE BESYLATE 10 MG/1
10 TABLET ORAL DAILY
Qty: 30 TAB | Refills: 2 | Status: SHIPPED | OUTPATIENT
Start: 2020-11-29 | End: 2020-12-23 | Stop reason: SDUPTHER

## 2020-12-23 DIAGNOSIS — N18.30 STAGE 3 CHRONIC KIDNEY DISEASE, UNSPECIFIED WHETHER STAGE 3A OR 3B CKD (HCC): ICD-10-CM

## 2020-12-23 DIAGNOSIS — I10 ESSENTIAL HYPERTENSION: ICD-10-CM

## 2020-12-23 RX ORDER — HYDROCORTISONE VALERATE 2 MG/G
CREAM TOPICAL 2 TIMES DAILY
Qty: 15 G | Refills: 3 | Status: SHIPPED | OUTPATIENT
Start: 2020-12-23 | End: 2021-07-26 | Stop reason: SDUPTHER

## 2020-12-23 RX ORDER — AMLODIPINE BESYLATE 10 MG/1
10 TABLET ORAL DAILY
Qty: 30 TAB | Refills: 2 | Status: SHIPPED | OUTPATIENT
Start: 2020-12-23 | End: 2021-04-25

## 2021-01-15 ENCOUNTER — OFFICE VISIT (OUTPATIENT)
Dept: INTERNAL MEDICINE CLINIC | Age: 66
End: 2021-01-15
Payer: COMMERCIAL

## 2021-01-15 VITALS
HEIGHT: 76 IN | SYSTOLIC BLOOD PRESSURE: 142 MMHG | HEART RATE: 75 BPM | TEMPERATURE: 97.5 F | BODY MASS INDEX: 29.83 KG/M2 | RESPIRATION RATE: 18 BRPM | WEIGHT: 245 LBS | DIASTOLIC BLOOD PRESSURE: 92 MMHG | OXYGEN SATURATION: 98 %

## 2021-01-15 DIAGNOSIS — Z11.59 ENCOUNTER FOR HEPATITIS C SCREENING TEST FOR LOW RISK PATIENT: ICD-10-CM

## 2021-01-15 DIAGNOSIS — Z12.11 ENCOUNTER FOR COLORECTAL CANCER SCREENING: ICD-10-CM

## 2021-01-15 DIAGNOSIS — N18.30 STAGE 3 CHRONIC KIDNEY DISEASE, UNSPECIFIED WHETHER STAGE 3A OR 3B CKD (HCC): ICD-10-CM

## 2021-01-15 DIAGNOSIS — Z71.89 ADVANCED DIRECTIVES, COUNSELING/DISCUSSION: ICD-10-CM

## 2021-01-15 DIAGNOSIS — Z12.12 ENCOUNTER FOR COLORECTAL CANCER SCREENING: ICD-10-CM

## 2021-01-15 DIAGNOSIS — I10 ESSENTIAL HYPERTENSION: ICD-10-CM

## 2021-01-15 DIAGNOSIS — Z91.89 ENCOUNTER FOR HEPATITIS C VIRUS SCREENING TEST FOR HIGH RISK PATIENT: ICD-10-CM

## 2021-01-15 DIAGNOSIS — Z12.5 PROSTATE CANCER SCREENING: ICD-10-CM

## 2021-01-15 DIAGNOSIS — Z11.59 ENCOUNTER FOR HEPATITIS C VIRUS SCREENING TEST FOR HIGH RISK PATIENT: ICD-10-CM

## 2021-01-15 DIAGNOSIS — Z00.00 MEDICARE ANNUAL WELLNESS VISIT, SUBSEQUENT: Primary | ICD-10-CM

## 2021-01-15 PROCEDURE — G0439 PPPS, SUBSEQ VISIT: HCPCS | Performed by: INTERNAL MEDICINE

## 2021-01-15 RX ORDER — VALSARTAN 160 MG/1
160 TABLET ORAL DAILY
Qty: 30 TAB | Refills: 1 | Status: SHIPPED | OUTPATIENT
Start: 2021-01-15 | End: 2021-03-10

## 2021-01-15 NOTE — PATIENT INSTRUCTIONS
Medicare Wellness Visit, Male The best way to live healthy is to have a lifestyle where you eat a well-balanced diet, exercise regularly, limit alcohol use, and quit all forms of tobacco/nicotine, if applicable. Regular preventive services are another way to keep healthy. Preventive services (vaccines, screening tests, monitoring & exams) can help personalize your care plan, which helps you manage your own care. Screening tests can find health problems at the earliest stages, when they are easiest to treat. [facility] follows the current, evidence-based guidelines published by the [facility] Sean Ej ([facility]) when recommending preventive services for our patients. Because we follow these guidelines, sometimes recommendations change over time as research supports it. (For example, a prostate screening blood test is no longer routinely recommended for men with no symptoms). Of course, you and your doctor may decide to screen more often for some diseases, based on your risk and co-morbidities (chronic disease you are already diagnosed with). Preventive services for you include: - Medicare offers their members a free annual wellness visit, which is time for you and your primary care provider to discuss and plan for your preventive service needs. Take advantage of this benefit every year! 
-All adults over age 72 should receive the recommended pneumonia vaccines. Current USPSTF guidelines recommend a series of two vaccines for the best pneumonia protection.  
-All adults should have a flu vaccine yearly and tetanus vaccine every 10 years. 
-All adults age 48 and older should receive the shingles vaccines (series of two vaccines). -All adults age 38-68 who are overweight should have a diabetes screening test once every three years. -Other screening tests & preventive services for persons with diabetes include: an eye exam to screen for diabetic retinopathy, a kidney function test, a foot exam, and stricter control over your cholesterol.  
-Cardiovascular screening for adults with routine risk involves an electrocardiogram (ECG) at intervals determined by the provider.  
-Colorectal cancer screening should be done for adults age 54-65 with no increased risk factors for colorectal cancer. There are a number of acceptable methods of screening for this type of cancer. Each test has its own benefits and drawbacks. Discuss with your provider what is most appropriate for you during your annual wellness visit. The different tests include: colonoscopy (considered the best screening method), a fecal occult blood test, a fecal DNA test, and sigmoidoscopy. 
-All adults born between [facility] should be screened once for Hepatitis C. 
-An Abdominal Aortic Aneurysm (AAA) Screening is recommended for men age 73-68 who has ever smoked in their lifetime. Here is a list of your current Health Maintenance items (your personalized list of preventive services) with a due date: 
Health Maintenance Due Topic Date Due  
 Hepatitis C Test  1955  Shingles Vaccine (1 of 2) 07/31/2003  Colorectal Screening  01/01/2010  
 DTaP/Tdap/Td  (2 - Tdap) 01/01/2017  Pneumococcal Vaccine (1 of 1 - PPSV23) 07/31/2018  Cholesterol Test   02/12/2020

## 2021-01-15 NOTE — ACP (ADVANCE CARE PLANNING)
Advance Care Planning     General Advance Care Planning (ACP) Conversation      Date of Conversation: 1/15/2021  Conducted with: Patient with Decision Making Capacity    Healthcare Decision Maker:     Click here to complete [employer] including selection of the Healthcare Decision Maker Relationship (ie \"Primary\")  Today we documented Decision Maker(s). The patient will provide ACP documents. Content/Action Overview:    Has ACP document(s) NOT on file - requested patient to provide  Reviewed DNR/DNI and patient elects Full Code (Attempt Resuscitation)    Length of Voluntary ACP Conversation in minutes:  <16 minutes (Non-Billable)    Chris Malcolm MD

## 2021-01-15 NOTE — PROGRESS NOTES
Marko Howard is a 77 y.o. male who presents today for Annual Wellness Visit  . He has a history of   Patient Active Problem List   Diagnosis Code    HTN (hypertension) I10    Dermatitis L30.9    CKD (chronic kidney disease) stage 3, GFR 30-59 ml/min N18.30   . Today patient is here for his Medicare wellness exam.. Hypertension- Still elevated on full dose amlodipine. Hypertension ROS: taking medications as instructed, no medication side effects noted, no TIA's, no chest pain on exertion, no dyspnea on exertion, no swelling of ankles     reports that he quit smoking about 36 years ago. His smoking use included cigarettes. He has a 8.50 pack-year smoking history. He has never used smokeless tobacco.    reports no history of alcohol use. BP Readings from Last 2 Encounters:   01/15/21 (!) 142/92   11/03/20 (!) 160/100     Will screen for HLD    Health maintenance hx includes:  Exercise: moderately active  Social: Works for [employer]  in [employer]. Recently has been working from home. Walking. More spin classes at the [employer]. Lives at home with wife and two daughters. Has 2 children. Cancer screening:    Colon cancer screening:  Last Colonoscopy:A long time ago. Prostate cancer screening: PSA and/or BARRETT: 2018 and was normal    Immunizations:     Immunization History   Administered Date(s) Administered    DTAP Vaccine 01/01/2007    Hep A and Hep B Vaccine 07/02/2013, 07/09/2013, 07/25/2013      Immunization status: Refusing immunizations. ROS  Review of Systems   Constitutional: Negative for chills, fever and weight loss. HENT: Negative for congestion and sore throat. Eyes: Negative for blurred vision, double vision and photophobia. Respiratory: Negative for cough and shortness of breath. Cardiovascular: Negative for chest pain, palpitations and leg swelling.    Gastrointestinal: Negative for abdominal pain, constipation, diarrhea, heartburn, nausea and vomiting. Genitourinary: Negative for dysuria, frequency and urgency. Musculoskeletal: Negative for joint pain and myalgias. Skin: Negative for rash. Neurological: Negative. Negative for headaches. Endo/Heme/Allergies: Does not bruise/bleed easily. Psychiatric/Behavioral: Negative for memory loss and suicidal ideas. Visit Vitals  BP (!) 142/92 (BP 1 Location: Right arm, BP Patient Position: Sitting)   Pulse 75   Temp 97.5 °F (36.4 °C) (Oral)   Resp 18   Ht 6' 4\" (1.93 m)   Wt 245 lb (111.1 kg)   SpO2 98%   BMI 29.82 kg/m²       Physical Exam  Constitutional:       Appearance: He is well-developed. He is not diaphoretic. HENT:      Head: Normocephalic and atraumatic. Eyes:      Pupils: Pupils are equal, round, and reactive to light. Neck:      Musculoskeletal: Normal range of motion and neck supple. Vascular: No JVD. Cardiovascular:      Rate and Rhythm: Normal rate and regular rhythm. Heart sounds: No murmur. Pulmonary:      Effort: Pulmonary effort is normal. No respiratory distress. Breath sounds: Normal breath sounds. No wheezing. Abdominal:      General: Bowel sounds are normal. There is no distension. Palpations: Abdomen is soft. Tenderness: There is no abdominal tenderness. Musculoskeletal: Normal range of motion. Skin:     General: Skin is warm and dry. Neurological:      Mental Status: He is alert and oriented to person, place, and time. Cranial Nerves: No cranial nerve deficit. Psychiatric:         Behavior: Behavior normal.           Current Outpatient Medications   Medication Sig    valsartan (DIOVAN) 160 mg tablet Take 1 Tab by mouth daily.  hydrocortisone valerate (WESTCORT) 0.2 % topical cream Apply  to affected area two (2) times a day. use thin layer    amLODIPine (NORVASC) 10 mg tablet Take 1 Tab by mouth daily.  multivitamin (ONE A DAY) tablet Take 1 Tab by mouth daily.        No current facility-administered medications for this visit. Past Medical History:   Diagnosis Date    CKD (chronic kidney disease) stage 3, GFR 30-59 ml/min     Dermatitis 7/3/2013    HTN (hypertension) 2013    Neck pain     c-spine arthritis      Past Surgical History:   Procedure Laterality Date    HX COLONOSCOPY  ? Social History     Tobacco Use    Smoking status: Former Smoker     Packs/day: 0.25     Years: 34.00     Pack years: 8.50     Types: Cigarettes     Quit date: 1985     Years since quittin.0    Smokeless tobacco: Never Used   Substance Use Topics    Alcohol use: No      Family History   Problem Relation Age of Onset    Cancer Mother 76        pancreatic    Cancer Father 76        lymphoma    Stroke Father 76        Allergies   Allergen Reactions    Phenylephrine Nausea and Vomiting and Anxiety        Assessment/Plan  Diagnoses and all orders for this visit:    1. Medicare annual wellness visit, subsequent- Ashley Hernandez III was counseled on age-appropriate/ guideline-based risk prevention behaviors and screening for a 77y.o. year old   male . We also discussed adjustments in screening based on family history if necessary. Printed instructions for preventative screening guidelines and healthy behaviors given to patient with after visit summary. 2. Advanced directives, counseling/discussion    3. Encounter for colorectal cancer screening  -     COLOGUARD TEST (FECAL DNA COLORECTAL CANCER SCREENING)    4. Essential hypertension- not at goal. Start valsartan at 80mg, increase after one week if not at goal.   -     valsartan (DIOVAN) 160 mg tablet; Take 1 Tab by mouth daily.  -     CBC WITH AUTOMATED DIFF; Future  -     METABOLIC PANEL, COMPREHENSIVE; Future  -     LIPID PANEL; Future    5. Stage 3 chronic kidney disease, unspecified whether stage 3a or 3b CKD- blood work 10 days after starting ARB    6. Encounter for hepatitis C screening test for low risk patient  -     HEPATITIS C AB; Future    7. Prostate cancer screening  -     PSA SCREENING (SCREENING); Future    8. Encounter for hepatitis C virus screening test for high risk patient  -     HEPATITIS C AB; Future            Aravind Weeks MD  1/16/2021    This note was created with the help of speech recognition software Avel Ramirez) and may contain some 'sound alike' errors. This is the Subsequent Medicare Annual Wellness Exam, performed 12 months or more after the Initial AWV or the last Subsequent AWV    I have reviewed the patient's medical history in detail and updated the computerized patient record. Depression Risk Factor Screening:     3 most recent PHQ Screens 1/15/2021   PHQ Not Done -   Little interest or pleasure in doing things Not at all   Feeling down, depressed, irritable, or hopeless Not at all   Total Score PHQ 2 0       Alcohol Risk Screen    Do you average more than 1 drink per night or more than 7 drinks a week: No    In the past three months have you have had more than 4 drinks containing alcohol on one occasion: No        Functional Ability and Level of Safety:    Hearing: Hearing is good. Activities of Daily Living: The home contains: no safety equipment. Patient does total self care      Ambulation: with no difficulty     Fall Risk:  Fall Risk Assessment, last 12 mths 1/15/2021   Able to walk? Yes   Fall in past 12 months? 0   Do you feel unsteady? 0   Are you worried about falling 0      Abuse Screen:  Patient is not abused       Cognitive Screening    Has your family/caregiver stated any concerns about your memory: no       Assessment/Plan   Education and counseling provided:  Are appropriate based on today's review and evaluation  End-of-Life planning (with patient's consent)  Pneumococcal Vaccine  Influenza Vaccine  Prostate cancer screening tests (PSA, covered annually)  Colorectal cancer screening tests    Diagnoses and all orders for this visit:    1. Medicare annual wellness visit, subsequent    2.  Advanced directives, counseling/discussion    3. Encounter for colorectal cancer screening  -     COLOGUARD TEST (FECAL DNA COLORECTAL CANCER SCREENING)    4. Essential hypertension  -     valsartan (DIOVAN) 160 mg tablet; Take 1 Tab by mouth daily.  -     CBC WITH AUTOMATED DIFF; Future  -     METABOLIC PANEL, COMPREHENSIVE; Future  -     LIPID PANEL; Future    5. Stage 3 chronic kidney disease, unspecified whether stage 3a or 3b CKD    6. Encounter for hepatitis C screening test for low risk patient  -     HEPATITIS C AB; Future    7. Prostate cancer screening  -     PSA SCREENING (SCREENING); Future    8. Encounter for hepatitis C virus screening test for high risk patient  -     HEPATITIS C AB; Future        Health Maintenance Due     Health Maintenance Due   Topic Date Due    Hepatitis C Screening  1955    Shingrix Vaccine Age 50> (1 of 2) 07/31/2003    Colorectal Cancer Screening Combo  01/01/2010    DTaP/Tdap/Td series (2 - Tdap) 01/01/2017    AAA Screening 73-69 YO Male Smoking Patients  07/31/2018    Pneumococcal 65+ years (1 of 1 - PPSV23) 07/31/2018    Lipid Screen  02/12/2020       Patient Care Team   Patient Care Team:  Michael Capone MD as PCP - General (Internal Medicine)  Michael Capone MD as PCP - REHABILITATION [facility] Empaneled Provider    History     Patient Active Problem List   Diagnosis Code    HTN (hypertension) I10    Dermatitis L30.9    CKD (chronic kidney disease) stage 3, GFR 30-59 ml/min N18.30     Past Medical History:   Diagnosis Date    CKD (chronic kidney disease) stage 3, GFR 30-59 ml/min     Dermatitis 7/3/2013    HTN (hypertension) 7/2/2013    Neck pain     c-spine arthritis      Past Surgical History:   Procedure Laterality Date    HX COLONOSCOPY  2000? Current Outpatient Medications   Medication Sig Dispense Refill    valsartan (DIOVAN) 160 mg tablet Take 1 Tab by mouth daily.  30 Tab 1    hydrocortisone valerate (WESTCORT) 0.2 % topical cream Apply  to affected area two (2) times a day. use thin layer 15 g 3    amLODIPine (NORVASC) 10 mg tablet Take 1 Tab by mouth daily. 30 Tab 2    multivitamin (ONE A DAY) tablet Take 1 Tab by mouth daily. Allergies   Allergen Reactions    Phenylephrine Nausea and Vomiting and Anxiety       Family History   Problem Relation Age of Onset    Cancer Mother 76        pancreatic    Cancer Father 76        lymphoma    Stroke Father 76     Social History     Tobacco Use    Smoking status: Former Smoker     Packs/day: 0.25     Years: 34.00     Pack years: 8.50     Types: Cigarettes     Quit date: 1985     Years since quittin.0    Smokeless tobacco: Never Used   Substance Use Topics    Alcohol use:  No

## 2021-01-30 DIAGNOSIS — I10 ESSENTIAL HYPERTENSION: ICD-10-CM

## 2021-01-30 DIAGNOSIS — N18.30 STAGE 3 CHRONIC KIDNEY DISEASE, UNSPECIFIED WHETHER STAGE 3A OR 3B CKD (HCC): ICD-10-CM

## 2021-01-30 RX ORDER — AMLODIPINE BESYLATE 10 MG/1
10 TABLET ORAL DAILY
Qty: 30 TAB | Refills: 2 | Status: CANCELLED | OUTPATIENT
Start: 2021-01-30

## 2021-02-01 ENCOUNTER — TELEPHONE (OUTPATIENT)
Dept: INTERNAL MEDICINE CLINIC | Age: 66
End: 2021-02-01

## 2021-02-01 NOTE — TELEPHONE ENCOUNTER
----- Message from Dhiraj Villalpando sent at 2/1/2021  3:28 PM EST -----  Regarding: Dr. Gissell Christiansen first and last name: Pt      Reason for call: Pt requested refill via [employer] over the weekend for \"amlodipine 10mg\". Advised that Dr. Nannette Meckel cleared the medication for the patient to get a refill but [facility] is not acknowledging that is it cleared. The office needs to contact [facility] to allow them to dispense the medication to the patient. Callback required yes/no and why: yes, clarification      Best contact number(s): [phone]      Details to clarify the request: [facility]: [phone]. Pt is out of this medication.        Dhiraj Villalpando

## 2021-02-01 NOTE — TELEPHONE ENCOUNTER
Pharmacy needed a 90day supply. V.O. given. Called pt and advised he will need to schedule appt before next refill.

## 2021-02-16 LAB
ALBUMIN SERPL-MCNC: 3.7 G/DL (ref 3.5–5)
ALBUMIN/GLOB SERPL: 1.1 {RATIO} (ref 1.1–2.2)
ALP SERPL-CCNC: 73 U/L (ref 45–117)
ALT SERPL-CCNC: 22 U/L (ref 12–78)
ANION GAP SERPL CALC-SCNC: 7 MMOL/L (ref 5–15)
AST SERPL-CCNC: 18 U/L (ref 15–37)
BASOPHILS # BLD: 0 K/UL (ref 0–0.1)
BASOPHILS NFR BLD: 0 % (ref 0–1)
BILIRUB SERPL-MCNC: 1.1 MG/DL (ref 0.2–1)
BUN SERPL-MCNC: 19 MG/DL (ref 6–20)
BUN/CREAT SERPL: 10 (ref 12–20)
CALCIUM SERPL-MCNC: 8.4 MG/DL (ref 8.5–10.1)
CHLORIDE SERPL-SCNC: 108 MMOL/L (ref 97–108)
CHOLEST SERPL-MCNC: 186 MG/DL
CO2 SERPL-SCNC: 25 MMOL/L (ref 21–32)
CREAT SERPL-MCNC: 1.84 MG/DL (ref 0.7–1.3)
DIFFERENTIAL METHOD BLD: ABNORMAL
EOSINOPHIL # BLD: 0 K/UL (ref 0–0.4)
EOSINOPHIL NFR BLD: 1 % (ref 0–7)
ERYTHROCYTE [DISTWIDTH] IN BLOOD BY AUTOMATED COUNT: 12.5 % (ref 11.5–14.5)
GLOBULIN SER CALC-MCNC: 3.3 G/DL (ref 2–4)
GLUCOSE SERPL-MCNC: 88 MG/DL (ref 65–100)
HCT VFR BLD AUTO: 42.3 % (ref 36.6–50.3)
HCV AB SERPL QL IA: NONREACTIVE
HCV COMMENT,HCGAC: NORMAL
HDLC SERPL-MCNC: 46 MG/DL
HDLC SERPL: 4 {RATIO} (ref 0–5)
HGB BLD-MCNC: 13.7 G/DL (ref 12.1–17)
IMM GRANULOCYTES # BLD AUTO: 0 K/UL (ref 0–0.04)
IMM GRANULOCYTES NFR BLD AUTO: 0 % (ref 0–0.5)
LDLC SERPL CALC-MCNC: 125.4 MG/DL (ref 0–100)
LIPID PROFILE,FLP: ABNORMAL
LYMPHOCYTES # BLD: 1.3 K/UL (ref 0.8–3.5)
LYMPHOCYTES NFR BLD: 38 % (ref 12–49)
MCH RBC QN AUTO: 30.8 PG (ref 26–34)
MCHC RBC AUTO-ENTMCNC: 32.4 G/DL (ref 30–36.5)
MCV RBC AUTO: 95.1 FL (ref 80–99)
MONOCYTES # BLD: 0.4 K/UL (ref 0–1)
MONOCYTES NFR BLD: 11 % (ref 5–13)
NEUTS SEG # BLD: 1.6 K/UL (ref 1.8–8)
NEUTS SEG NFR BLD: 50 % (ref 32–75)
NRBC # BLD: 0 K/UL (ref 0–0.01)
NRBC BLD-RTO: 0 PER 100 WBC
PLATELET # BLD AUTO: 205 K/UL (ref 150–400)
PMV BLD AUTO: 11.1 FL (ref 8.9–12.9)
POTASSIUM SERPL-SCNC: 4 MMOL/L (ref 3.5–5.1)
PROT SERPL-MCNC: 7 G/DL (ref 6.4–8.2)
PSA SERPL-MCNC: 0.7 NG/ML (ref 0.01–4)
RBC # BLD AUTO: 4.45 M/UL (ref 4.1–5.7)
SODIUM SERPL-SCNC: 140 MMOL/L (ref 136–145)
TRIGL SERPL-MCNC: 73 MG/DL (ref ?–150)
VLDLC SERPL CALC-MCNC: 14.6 MG/DL
WBC # BLD AUTO: 3.3 K/UL (ref 4.1–11.1)

## 2021-03-10 DIAGNOSIS — I10 ESSENTIAL HYPERTENSION: ICD-10-CM

## 2021-03-10 RX ORDER — VALSARTAN 160 MG/1
TABLET ORAL
Qty: 30 TAB | Refills: 1 | Status: SHIPPED | OUTPATIENT
Start: 2021-03-10 | End: 2021-08-12 | Stop reason: SDUPTHER

## 2021-11-10 DIAGNOSIS — I10 ESSENTIAL HYPERTENSION: ICD-10-CM

## 2021-11-10 RX ORDER — VALSARTAN 160 MG/1
TABLET ORAL
Qty: 90 TABLET | Refills: 0 | Status: SHIPPED | OUTPATIENT
Start: 2021-11-10 | End: 2022-03-13

## 2022-01-24 ENCOUNTER — PATIENT MESSAGE (OUTPATIENT)
Dept: INTERNAL MEDICINE CLINIC | Age: 67
End: 2022-01-24

## 2022-01-24 DIAGNOSIS — N18.30 STAGE 3 CHRONIC KIDNEY DISEASE, UNSPECIFIED WHETHER STAGE 3A OR 3B CKD (HCC): ICD-10-CM

## 2022-01-24 DIAGNOSIS — I10 ESSENTIAL HYPERTENSION: ICD-10-CM

## 2022-01-25 RX ORDER — TRIAMCINOLONE ACETONIDE 1 MG/G
CREAM TOPICAL 2 TIMES DAILY
Qty: 15 G | Refills: 0 | Status: SHIPPED | OUTPATIENT
Start: 2022-01-25

## 2022-01-25 RX ORDER — AMLODIPINE BESYLATE 10 MG/1
10 TABLET ORAL DAILY
Qty: 90 TABLET | Refills: 0 | Status: SHIPPED | OUTPATIENT
Start: 2022-01-25

## 2024-09-26 ENCOUNTER — HOSPITAL ENCOUNTER (OUTPATIENT)
Facility: HOSPITAL | Age: 69
Setting detail: SPECIMEN
Discharge: HOME | End: 2024-09-29

## 2024-10-04 NOTE — CONSULTS
needed)  Efrain Dx sent given HPV+  Will refer to medical oncology  Will follow up in 2 weeks        The patient prefers to have treatment at [facility]    --    Thank you for the opportunity to participate in the care of Mr. Fisher. Please feel free to contact me with any questions or concerns.    Alona Lara, DO  [facility]  [facility]  [address] [place] [address]  P: [phone]  [facility] [address], [place] [address]  P: [phone]  [facility]  [address] [place] [address], [address], [place] [address]  P: [phone]    Time and Counseling: I spent a total of 65 minutes in care of this patient during today's encounter on 10/4/24.    Carbon Copy:  Blayne Gracia MD

## 2024-10-08 ENCOUNTER — HOSPITAL ENCOUNTER (OUTPATIENT)
Facility: HOSPITAL | Age: 69
Discharge: HOME | End: 2024-10-11

## 2024-10-08 ENCOUNTER — CLINICAL DOCUMENTATION (OUTPATIENT)
Facility: HOSPITAL | Age: 69
End: 2024-10-08

## 2024-10-08 VITALS
SYSTOLIC BLOOD PRESSURE: 173 MMHG | WEIGHT: 200.8 LBS | RESPIRATION RATE: 18 BRPM | TEMPERATURE: 98.4 F | HEART RATE: 88 BPM | DIASTOLIC BLOOD PRESSURE: 120 MMHG | BODY MASS INDEX: 25.77 KG/M2 | HEIGHT: 74 IN

## 2024-10-08 DIAGNOSIS — C10.9 SQUAMOUS CELL CARCINOMA OF OROPHARYNX (HCC): Primary | ICD-10-CM

## 2024-10-08 ASSESSMENT — PAIN DESCRIPTION - LOCATION: LOCATION: NECK;THROAT

## 2024-10-08 ASSESSMENT — PAIN SCALES - GENERAL: PAINLEVEL_OUTOF10: 5

## 2024-10-08 ASSESSMENT — PAIN DESCRIPTION - ORIENTATION: ORIENTATION: RIGHT

## 2024-10-09 DIAGNOSIS — C09.9 CANCER OF TONSIL (HCC): Primary | ICD-10-CM

## 2024-10-09 NOTE — PROGRESS NOTES
NCCN Distress Thermometer    Date Screening Completed: 10/8/24    Screening Declined:  [] Yes    Number that best describes how much distress you've experienced in the past week, including today?  0 [] - No distress 1 []      2 [x]      3 []      4 []       5 []       6 []      7 []      8 []      9 []       10 [] - Extreme distress    PROBLEM LIST  Have you had concerns about any of the items below in the past week, including today?      Physical Concerns Practical Concerns   [] Pain [] Taking care of myself    [x] Sleep [] Taking care of others    [x] Fatigue [] Work   [] Tobacco use  [] School   [] Substance use  [] Housing   [] Memory or concentration [] Finances   [] Sexual health [] Insurance   [] Changes in eating  [] Transportation   [] Loss or change of physical abilities  []     [x] Having enough food   Emotional Concerns [] Access to medicine   [x] Worry or anxiety [] Treatment decisions   [] Sadness or depression    [] Loss of interest or enjoyment  Spiritual or Jewish Concerns   [] Grief or loss  [] Sense of meaning or purpose   [] Fear [] Changes in darian or beliefs   [] Loneliness  [] Death, dying, or afterlife   [] Anger [] Conflict between beliefs and cancer treatments    [] Changes in appearance [] Relationship with the sacred   [] Feelings of worthlessness or being a burden [] Ritual or dietary needs        Social Concerns     [] Relationship with spouse or partner     [] Relationship with children    [] Relationship with family members     [] Relationship with friends or coworkers     [] Communication with health care team     [] Ability to have children     [] Prejudice or discrimination        Other Concerns:     Patient received resource information and education:  [] Yes  [] No
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
## 2024-10-16 ENCOUNTER — CLINICAL DOCUMENTATION (OUTPATIENT)
Age: 69
End: 2024-10-16

## 2024-10-16 ENCOUNTER — INITIAL CONSULT (OUTPATIENT)
Age: 69
End: 2024-10-16
Payer: MEDICARE

## 2024-10-16 VITALS
BODY MASS INDEX: 24.9 KG/M2 | OXYGEN SATURATION: 97 % | RESPIRATION RATE: 16 BRPM | HEART RATE: 80 BPM | SYSTOLIC BLOOD PRESSURE: 149 MMHG | DIASTOLIC BLOOD PRESSURE: 106 MMHG | WEIGHT: 194 LBS | HEIGHT: 74 IN | TEMPERATURE: 97.9 F

## 2024-10-16 DIAGNOSIS — C10.9 OROPHARYNGEAL CARCINOMA (HCC): ICD-10-CM

## 2024-10-16 DIAGNOSIS — C10.9 OROPHARYNGEAL CARCINOMA (HCC): Primary | ICD-10-CM

## 2024-10-16 PROCEDURE — 99205 OFFICE O/P NEW HI 60 MIN: CPT | Performed by: INTERNAL MEDICINE

## 2024-10-16 RX ORDER — ACETAMINOPHEN 160 MG
2000 TABLET,DISINTEGRATING ORAL DAILY
COMMUNITY

## 2024-10-16 RX ORDER — GINSENG 100 MG
50 CAPSULE ORAL DAILY
COMMUNITY

## 2024-10-16 ASSESSMENT — PATIENT HEALTH QUESTIONNAIRE - PHQ9
SUM OF ALL RESPONSES TO PHQ9 QUESTIONS 1 & 2: 0
SUM OF ALL RESPONSES TO PHQ QUESTIONS 1-9: 0
2. FEELING DOWN, DEPRESSED OR HOPELESS: NOT AT ALL
1. LITTLE INTEREST OR PLEASURE IN DOING THINGS: NOT AT ALL

## 2024-10-16 NOTE — PROGRESS NOTES
Jayson Fisher III is a 69 y.o. male new patient establishing care for SCC of oropharyngeal cancer.

## 2024-10-16 NOTE — PROGRESS NOTES
[facility] [place] at [facility]  [address], [address] [facility] [address]  W: [phone]  F: [phone]    Medical Nutrition Therapy  Nutrition Encounter:    Referral received for new H&N. Met with patient and wife, explained that RD is available to address nutrition throughout the spectrum of care. Patient with progressive weight loss related to location of tumor.  He has had to adjust his diet and consume mostly soft foods.  Discussed increased calorie demands with treatment.  That potential side effects impact the ability to meet nutrition needs.  Discussed making foods more calorically dense. He does juices, so we talked about ways to add bulk too it.    Food recall: Cod, salmon, broccoli, cauliflower, oatmeal, avocado, juicing fruits.  Soft foods.    Avoiding simple carbohydrates,   Difficult to swallow breads, steak, chicken are difficult to swallow.  Nuts too tough to swallow.        Diagnosis: Stage III HPV-related oropharyngeal carcinoma     Ht Readings from Last 1 Encounters:   10/16/24 1.88 m (6' 2\")       Wt Readings from Last 10 Encounters:   10/16/24 88 kg (194 lb)   10/08/24 91.1 kg (200 lb 12.8 oz)       Estimated Nutrition Needs:   Calorie Range: 2200-2640kcal/day      Protein Range: 88-115g/day      Fluid Needs: 2200ml    Plan:  - Discussed importance of calories and protein  - Provided samples of ensure/boost  - Discussed increasing caloric intake.        Signed By: AI LEYVA RD

## 2024-10-17 LAB
ALBUMIN SERPL-MCNC: 3.8 G/DL (ref 3.5–5)
ALBUMIN/GLOB SERPL: 1 (ref 1.1–2.2)
ALP SERPL-CCNC: 70 U/L (ref 45–117)
ALT SERPL-CCNC: 27 U/L (ref 12–78)
ANION GAP SERPL CALC-SCNC: 6 MMOL/L (ref 2–12)
AST SERPL-CCNC: 26 U/L (ref 15–37)
BASOPHILS # BLD: 0 K/UL (ref 0–0.1)
BASOPHILS NFR BLD: 1 % (ref 0–1)
BILIRUB SERPL-MCNC: 0.6 MG/DL (ref 0.2–1)
BUN SERPL-MCNC: 10 MG/DL (ref 6–20)
BUN/CREAT SERPL: 6 (ref 12–20)
CALCIUM SERPL-MCNC: 9.4 MG/DL (ref 8.5–10.1)
CHLORIDE SERPL-SCNC: 101 MMOL/L (ref 97–108)
CO2 SERPL-SCNC: 30 MMOL/L (ref 21–32)
CREAT SERPL-MCNC: 1.61 MG/DL (ref 0.7–1.3)
DIFFERENTIAL METHOD BLD: ABNORMAL
EOSINOPHIL # BLD: 0.1 K/UL (ref 0–0.4)
EOSINOPHIL NFR BLD: 2 % (ref 0–7)
ERYTHROCYTE [DISTWIDTH] IN BLOOD BY AUTOMATED COUNT: 11.9 % (ref 11.5–14.5)
GLOBULIN SER CALC-MCNC: 3.7 G/DL (ref 2–4)
GLUCOSE SERPL-MCNC: 95 MG/DL (ref 65–100)
HCT VFR BLD AUTO: 39 % (ref 36.6–50.3)
HGB BLD-MCNC: 12.5 G/DL (ref 12.1–17)
IMM GRANULOCYTES # BLD AUTO: 0 K/UL (ref 0–0.04)
IMM GRANULOCYTES NFR BLD AUTO: 0 % (ref 0–0.5)
LYMPHOCYTES # BLD: 0.9 K/UL (ref 0.8–3.5)
LYMPHOCYTES NFR BLD: 27 % (ref 12–49)
MCH RBC QN AUTO: 30.3 PG (ref 26–34)
MCHC RBC AUTO-ENTMCNC: 32.1 G/DL (ref 30–36.5)
MCV RBC AUTO: 94.7 FL (ref 80–99)
MONOCYTES # BLD: 0.4 K/UL (ref 0–1)
MONOCYTES NFR BLD: 13 % (ref 5–13)
NEUTS SEG # BLD: 1.9 K/UL (ref 1.8–8)
NEUTS SEG NFR BLD: 57 % (ref 32–75)
NRBC # BLD: 0 K/UL (ref 0–0.01)
NRBC BLD-RTO: 0 PER 100 WBC
PLATELET # BLD AUTO: 233 K/UL (ref 150–400)
PMV BLD AUTO: 11.3 FL (ref 8.9–12.9)
POTASSIUM SERPL-SCNC: 4.6 MMOL/L (ref 3.5–5.1)
PROT SERPL-MCNC: 7.5 G/DL (ref 6.4–8.2)
RBC # BLD AUTO: 4.12 M/UL (ref 4.1–5.7)
SODIUM SERPL-SCNC: 137 MMOL/L (ref 136–145)
WBC # BLD AUTO: 3.3 K/UL (ref 4.1–11.1)

## 2024-10-18 ENCOUNTER — CLINICAL DOCUMENTATION (OUTPATIENT)
Facility: HOSPITAL | Age: 69
End: 2024-10-18

## 2024-10-18 NOTE — PROGRESS NOTES
Was notified pt cancelled his staging PET CT. Called patient to inquire, he did not answer.  was left with number for call back.

## 2024-10-23 ENCOUNTER — CLINICAL DOCUMENTATION (OUTPATIENT)
Facility: HOSPITAL | Age: 69
End: 2024-10-23

## 2024-10-23 ENCOUNTER — TELEPHONE (OUTPATIENT)
Age: 69
End: 2024-10-23

## 2024-10-23 NOTE — PROGRESS NOTES
Mr. Fisher called my clinic to cancel his follow up visit and radiotherapy planning session. I called to inquire - spoke to both he and his wife. He is going to pursue immune system holistic treatment at this time, and is not interested in chemoRT. He does not want a follow up visit or call scheduled, rather will reach out if he needs us in the future. I discussed that chemoRT is still my recommendation and would be standard of care as opposed to other therapies - however he is not interested in this at this time. I encouraged to reach out if he needs us or changes his mind.     We also discussed the PET CT. He is not interested in fully staging his cancer despite my recommendation to do so prior to any therapy.

## 2024-10-23 NOTE — TELEPHONE ENCOUNTER
[facility]  [phone]    10/23/24- Called patient to follow up regarding his conversation with Dr. Lara.  Stated he called our office yesterday to cancel his upcoming appointment with us.  He's decided to try holistic therapy at this time.  Reiterated that we remain available if he changes his mind or needs us in the future.  He verbalized understanding and was appreciative of the call.